# Patient Record
Sex: FEMALE | Race: WHITE | NOT HISPANIC OR LATINO | Employment: FULL TIME | URBAN - METROPOLITAN AREA
[De-identification: names, ages, dates, MRNs, and addresses within clinical notes are randomized per-mention and may not be internally consistent; named-entity substitution may affect disease eponyms.]

---

## 2018-02-03 ENCOUNTER — HOSPITAL ENCOUNTER (EMERGENCY)
Facility: HOSPITAL | Age: 28
Discharge: HOME | End: 2018-02-03
Attending: EMERGENCY MEDICINE | Admitting: EMERGENCY MEDICINE

## 2018-02-03 VITALS
DIASTOLIC BLOOD PRESSURE: 81 MMHG | WEIGHT: 150 LBS | SYSTOLIC BLOOD PRESSURE: 114 MMHG | HEART RATE: 78 BPM | TEMPERATURE: 98.6 F | BODY MASS INDEX: 24.11 KG/M2 | RESPIRATION RATE: 16 BRPM | HEIGHT: 66 IN | OXYGEN SATURATION: 99 %

## 2018-02-03 DIAGNOSIS — E87.6 HYPOKALEMIA: ICD-10-CM

## 2018-02-03 DIAGNOSIS — R00.2 INTERMITTENT PALPITATIONS: ICD-10-CM

## 2018-02-03 DIAGNOSIS — R55 SYNCOPE, UNSPECIFIED SYNCOPE TYPE: Primary | ICD-10-CM

## 2018-02-03 LAB
ANION GAP SERPL CALCULATED.3IONS-SCNC: 8 MMOL/L (ref 3–11)
B-HCG UR QL: NEGATIVE
BASOPHILS # BLD AUTO: 0.04 10*3/MM3 (ref 0–0.2)
BASOPHILS NFR BLD AUTO: 0.5 % (ref 0–1)
BILIRUB UR QL STRIP: NEGATIVE
BUN BLD-MCNC: 10 MG/DL (ref 9–23)
BUN/CREAT SERPL: 16.7 (ref 7–25)
CALCIUM SPEC-SCNC: 8.9 MG/DL (ref 8.7–10.4)
CHLORIDE SERPL-SCNC: 109 MMOL/L (ref 99–109)
CLARITY UR: CLEAR
CO2 SERPL-SCNC: 24 MMOL/L (ref 20–31)
COLOR UR: YELLOW
CREAT BLD-MCNC: 0.6 MG/DL (ref 0.6–1.3)
DEPRECATED RDW RBC AUTO: 42.3 FL (ref 37–54)
EOSINOPHIL # BLD AUTO: 0.75 10*3/MM3 (ref 0–0.3)
EOSINOPHIL NFR BLD AUTO: 9.8 % (ref 0–3)
ERYTHROCYTE [DISTWIDTH] IN BLOOD BY AUTOMATED COUNT: 12.2 % (ref 11.3–14.5)
GFR SERPL CREATININE-BSD FRML MDRD: 118 ML/MIN/1.73
GFR SERPL CREATININE-BSD FRML MDRD: 143 ML/MIN/1.73
GLUCOSE BLD-MCNC: 78 MG/DL (ref 70–100)
GLUCOSE UR STRIP-MCNC: NEGATIVE MG/DL
HCT VFR BLD AUTO: 40 % (ref 34.5–44)
HGB BLD-MCNC: 13.3 G/DL (ref 11.5–15.5)
HGB UR QL STRIP.AUTO: NEGATIVE
IMM GRANULOCYTES # BLD: 0.01 10*3/MM3 (ref 0–0.03)
IMM GRANULOCYTES NFR BLD: 0.1 % (ref 0–0.6)
INTERNAL NEGATIVE CONTROL: NEGATIVE
INTERNAL POSITIVE CONTROL: POSITIVE
KETONES UR QL STRIP: ABNORMAL
LEUKOCYTE ESTERASE UR QL STRIP.AUTO: NEGATIVE
LYMPHOCYTES # BLD AUTO: 1.77 10*3/MM3 (ref 0.6–4.8)
LYMPHOCYTES NFR BLD AUTO: 23.2 % (ref 24–44)
Lab: NORMAL
MCH RBC QN AUTO: 31.5 PG (ref 27–31)
MCHC RBC AUTO-ENTMCNC: 33.3 G/DL (ref 32–36)
MCV RBC AUTO: 94.8 FL (ref 80–99)
MONOCYTES # BLD AUTO: 0.41 10*3/MM3 (ref 0–1)
MONOCYTES NFR BLD AUTO: 5.4 % (ref 0–12)
NEUTROPHILS # BLD AUTO: 4.66 10*3/MM3 (ref 1.5–8.3)
NEUTROPHILS NFR BLD AUTO: 61 % (ref 41–71)
NITRITE UR QL STRIP: NEGATIVE
PH UR STRIP.AUTO: 7.5 [PH] (ref 5–8)
PLATELET # BLD AUTO: 221 10*3/MM3 (ref 150–450)
PMV BLD AUTO: 11.3 FL (ref 6–12)
POTASSIUM BLD-SCNC: 2.8 MMOL/L (ref 3.5–5.5)
PROT UR QL STRIP: NEGATIVE
RBC # BLD AUTO: 4.22 10*6/MM3 (ref 3.89–5.14)
SODIUM BLD-SCNC: 141 MMOL/L (ref 132–146)
SP GR UR STRIP: 1.02 (ref 1–1.03)
UROBILINOGEN UR QL STRIP: ABNORMAL
WBC NRBC COR # BLD: 7.64 10*3/MM3 (ref 3.5–10.8)

## 2018-02-03 PROCEDURE — 81003 URINALYSIS AUTO W/O SCOPE: CPT | Performed by: EMERGENCY MEDICINE

## 2018-02-03 PROCEDURE — 93005 ELECTROCARDIOGRAM TRACING: CPT | Performed by: EMERGENCY MEDICINE

## 2018-02-03 PROCEDURE — 80048 BASIC METABOLIC PNL TOTAL CA: CPT | Performed by: EMERGENCY MEDICINE

## 2018-02-03 PROCEDURE — 99284 EMERGENCY DEPT VISIT MOD MDM: CPT

## 2018-02-03 PROCEDURE — 85025 COMPLETE CBC W/AUTO DIFF WBC: CPT | Performed by: EMERGENCY MEDICINE

## 2018-02-03 RX ORDER — POTASSIUM CHLORIDE 750 MG/1
40 CAPSULE, EXTENDED RELEASE ORAL ONCE
Status: COMPLETED | OUTPATIENT
Start: 2018-02-03 | End: 2018-02-03

## 2018-02-03 RX ADMIN — POTASSIUM CHLORIDE 40 MEQ: 750 CAPSULE, EXTENDED RELEASE ORAL at 21:53

## 2018-02-04 NOTE — ED PROVIDER NOTES
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  She comes the emergency room today after a syncopal episode.    At her baseline she is quite active.  She exercises on a daily basis and generally runs and lifts weights.  She's had no episodes of exertional chest pain, dyspnea, or syncope.    She really just did not feel well today was at the ambulance station and had a get up to do a run she started feel slightly blurry and lightheaded and then essentially passed out.  She was out for about 3 minutes.  There is no incontinence or tongue biting.  She came to quickly.  She is brought to the ER here for further evaluation.    She has a long history of palpitations and is had a Holter monitor the remote past that she's not sure what that showed.  She has palpitations nearly daily and she's hooked herself up to the EKG machine and she seen some premature atrial and ventricular contractions.  She's never had an echo the other type of cardiac testing that she can recall.  She's never had syncope or near syncope in the past that she can recall    She did start on Vyvanse approximate one month ago to help her concentrate more for studies.  She's had no recent illness.  There is been no fever, chills, runny nose, sore throat, or cough.  Bowel movements, urine, and menses been normal and she denies current pregnancy.    There is no family history of cardiac arrhythmia or heart disease that she knows of.    She does not feel like she was injured event today.  She is feels a little bit weak.        All other systems are reviewed and are negative except as noted above.          History provided by:  Patient and spouse      Review of Systems   All other systems reviewed and are negative.      Past Medical History:   Diagnosis Date   • Scarlet fever        Allergies   Allergen Reactions   • Biaxin [Clarithromycin] GI Intolerance   • Latex Hives   • Penicillins Hives       Past Surgical History:    Procedure Laterality Date   • TONSILLECTOMY         History reviewed. No pertinent family history.    Social History     Social History   • Marital status:      Spouse name: N/A   • Number of children: N/A   • Years of education: N/A     Social History Main Topics   • Smoking status: Current Every Day Smoker     Packs/day: 0.50     Types: Cigarettes   • Smokeless tobacco: None   • Alcohol use Yes      Comment: SOCIALLY   • Drug use: No   • Sexual activity: Not Asked     Other Topics Concern   • None     Social History Narrative   • None           Objective   Physical Exam   Constitutional: She is oriented to person, place, and time. She appears well-developed and well-nourished.   She is alert and oriented and in no acute distress   HENT:   Head: Normocephalic and atraumatic.   Right Ear: External ear normal.   Left Ear: External ear normal.   Nose: Nose normal.   Mouth/Throat: Oropharynx is clear and moist.   Eyes: Conjunctivae and EOM are normal. Pupils are equal, round, and reactive to light.   Neck: Normal range of motion. Neck supple. No JVD present. No tracheal deviation present. No thyromegaly present.   Cardiovascular: Normal rate, regular rhythm, normal heart sounds and intact distal pulses.  Exam reveals no gallop and no friction rub.    No murmur heard.  Pulmonary/Chest: Effort normal and breath sounds normal. No respiratory distress. She has no wheezes. She has no rales. She exhibits no tenderness.   Abdominal: Soft. Bowel sounds are normal. She exhibits no distension and no mass. There is no tenderness. No hernia.   Musculoskeletal: Normal range of motion. She exhibits no edema, tenderness or deformity.   Lymphadenopathy:     She has no cervical adenopathy.   Neurological: She is alert and oriented to person, place, and time. She has normal reflexes. She displays normal reflexes. No cranial nerve deficit. She exhibits normal muscle tone. Coordination normal.   Skin: Skin is warm and dry. No  rash noted. No erythema. No pallor.   Psychiatric: She has a normal mood and affect. Her behavior is normal. Judgment and thought content normal.   Nursing note and vitals reviewed.      Procedures         ED Course  ED Course      Recent Results (from the past 24 hour(s))   Basic Metabolic Panel    Collection Time: 02/03/18  7:37 PM   Result Value Ref Range    Glucose 78 70 - 100 mg/dL    BUN 10 9 - 23 mg/dL    Creatinine 0.60 0.60 - 1.30 mg/dL    Sodium 141 132 - 146 mmol/L    Potassium 2.8 (L) 3.5 - 5.5 mmol/L    Chloride 109 99 - 109 mmol/L    CO2 24.0 20.0 - 31.0 mmol/L    Calcium 8.9 8.7 - 10.4 mg/dL    eGFR  African Amer 143 >60 mL/min/1.73    eGFR Non African Amer 118 >60 mL/min/1.73    BUN/Creatinine Ratio 16.7 7.0 - 25.0    Anion Gap 8.0 3.0 - 11.0 mmol/L   CBC Auto Differential    Collection Time: 02/03/18  7:38 PM   Result Value Ref Range    WBC 7.64 3.50 - 10.80 10*3/mm3    RBC 4.22 3.89 - 5.14 10*6/mm3    Hemoglobin 13.3 11.5 - 15.5 g/dL    Hematocrit 40.0 34.5 - 44.0 %    MCV 94.8 80.0 - 99.0 fL    MCH 31.5 (H) 27.0 - 31.0 pg    MCHC 33.3 32.0 - 36.0 g/dL    RDW 12.2 11.3 - 14.5 %    RDW-SD 42.3 37.0 - 54.0 fl    MPV 11.3 6.0 - 12.0 fL    Platelets 221 150 - 450 10*3/mm3    Neutrophil % 61.0 41.0 - 71.0 %    Lymphocyte % 23.2 (L) 24.0 - 44.0 %    Monocyte % 5.4 0.0 - 12.0 %    Eosinophil % 9.8 (H) 0.0 - 3.0 %    Basophil % 0.5 0.0 - 1.0 %    Immature Grans % 0.1 0.0 - 0.6 %    Neutrophils, Absolute 4.66 1.50 - 8.30 10*3/mm3    Lymphocytes, Absolute 1.77 0.60 - 4.80 10*3/mm3    Monocytes, Absolute 0.41 0.00 - 1.00 10*3/mm3    Eosinophils, Absolute 0.75 (H) 0.00 - 0.30 10*3/mm3    Basophils, Absolute 0.04 0.00 - 0.20 10*3/mm3    Immature Grans, Absolute 0.01 0.00 - 0.03 10*3/mm3   Urinalysis With / Culture If Indicated - Urine, Clean Catch    Collection Time: 02/03/18  8:19 PM   Result Value Ref Range    Color, UA Yellow Yellow, Straw    Appearance, UA Clear Clear    pH, UA 7.5 5.0 - 8.0    Specific  Gravity, UA 1.018 1.001 - 1.030    Glucose, UA Negative Negative    Ketones, UA Trace (A) Negative    Bilirubin, UA Negative Negative    Blood, UA Negative Negative    Protein, UA Negative Negative    Leuk Esterase, UA Negative Negative    Nitrite, UA Negative Negative    Urobilinogen, UA 0.2 E.U./dL 0.2 - 1.0 E.U./dL   POCT Pregnancy, urine    Collection Time: 02/03/18  8:23 PM   Result Value Ref Range    HCG, Urine, QL Negative Negative    Lot Number [id number]     Internal Positive Control Positive     Internal Negative Control Negative      Note: In addition to lab results from this visit, the labs listed above may include labs taken at another facility or during a different encounter within the last 24 hours. Please correlate lab times with ED admission and discharge times for further clarification of the services performed during this visit.    No orders to display     Vitals:    02/03/18 2002 02/03/18 2030 02/03/18 2100 02/03/18 2130   BP: (!) 123/104 112/85 113/91 114/81   BP Location:       Patient Position: Standing      Pulse: 80 109 67 62   Resp:       Temp:       TempSrc:       SpO2:  99% 98% 98%   Weight:       Height:         Medications   potassium chloride (MICRO-K) CR capsule 40 mEq (not administered)     ECG/EMG Results (last 24 hours)     Procedure Component Value Units Date/Time    ECG 12 Lead [[id number]] Collected:  02/03/18 1921     Updated:  02/03/18 2010    Narrative:       Test Reason : SYNCOPE  Blood Pressure : **/** mmHG  Vent. Rate : 066 BPM     Atrial Rate : 066 BPM     P-R Int : 148 ms          QRS Dur : 080 ms      QT Int : 416 ms       P-R-T Axes : 051 027 033 degrees     QTc Int : 436 ms    Normal sinus rhythm  Normal ECG  No previous ECGs available  Confirmed by YUAN ODONNELL MD ([id number]) on 2/3/2018 8:10:41 PM    Referred By:  EDMD           Confirmed By:YUAN ODONNELL MD                  [facility]  Number of Diagnoses or Management Options  Hypokalemia:   Intermittent palpitations:    Syncope, unspecified syncope type:   Diagnosis management comments:       I reviewed all available studies at the bedside with the patient and her family.  Her labs are reassuring say for a slightly low potassium at 2.8.  Her EKG was reassuring with normal QT and no arrhythmia here that when I did watch her cardiac monitor she had occasional PVCs.    The young lady's had an episode of syncope today.  She's had palpitations for quite some time had a remote workup I think it's reasonable to replete her potassium and put her on a high potassium diet and have her follow-up early next week with her primary doctor to recheck her potassium and refer to our cardiac event center so she can get cardiac monitoring to see if she's having any type of malignant arrhythmia.    I talked her she had other episodes of syncope she may need further evaluation and tilt table testing and she understands this.  She'll call her doctor on Monday for follow-up.  Talked about strategies to help mitigate syncopal event.  She'll return to the ER if worsen anyway.    All are agreeable with the plan      Final diagnoses:   Syncope, unspecified syncope type   Hypokalemia   Intermittent palpitations            Wilbert Cortes MD  02/03/18 [id number]

## 2018-02-08 ENCOUNTER — HOSPITAL ENCOUNTER (OUTPATIENT)
Dept: CARDIOLOGY | Facility: HOSPITAL | Age: 28
Discharge: HOME | End: 2018-02-08

## 2018-02-08 ENCOUNTER — HOSPITAL ENCOUNTER (OUTPATIENT)
Dept: CARDIOLOGY | Facility: HOSPITAL | Age: 28
Discharge: HOME | End: 2018-02-08
Admitting: NURSE PRACTITIONER

## 2018-02-08 ENCOUNTER — OFFICE VISIT (OUTPATIENT)
Dept: CARDIOLOGY | Facility: HOSPITAL | Age: 28
End: 2018-02-08

## 2018-02-08 VITALS
SYSTOLIC BLOOD PRESSURE: 119 MMHG | WEIGHT: 157 LBS | RESPIRATION RATE: 17 BRPM | OXYGEN SATURATION: 98 % | TEMPERATURE: 98.1 F | BODY MASS INDEX: 24.64 KG/M2 | HEIGHT: 67 IN | HEART RATE: 91 BPM | DIASTOLIC BLOOD PRESSURE: 70 MMHG

## 2018-02-08 DIAGNOSIS — R55 SYNCOPE, UNSPECIFIED SYNCOPE TYPE: ICD-10-CM

## 2018-02-08 DIAGNOSIS — R00.2 PALPITATIONS: ICD-10-CM

## 2018-02-08 DIAGNOSIS — R55 SYNCOPE, UNSPECIFIED SYNCOPE TYPE: Primary | ICD-10-CM

## 2018-02-08 DIAGNOSIS — E87.6 HYPOKALEMIA: ICD-10-CM

## 2018-02-08 PROBLEM — K21.9 GERD (GASTROESOPHAGEAL REFLUX DISEASE): Status: ACTIVE | Noted: 2018-02-08

## 2018-02-08 LAB
ANION GAP SERPL CALCULATED.3IONS-SCNC: 9 MMOL/L (ref 3–11)
BUN BLD-MCNC: 12 MG/DL (ref 9–23)
BUN/CREAT SERPL: 17.1 (ref 7–25)
CALCIUM SPEC-SCNC: 9.2 MG/DL (ref 8.7–10.4)
CHLORIDE SERPL-SCNC: 103 MMOL/L (ref 99–109)
CO2 SERPL-SCNC: 26 MMOL/L (ref 20–31)
CREAT BLD-MCNC: 0.7 MG/DL (ref 0.6–1.3)
GFR SERPL CREATININE-BSD FRML MDRD: 99 ML/MIN/1.73
GLUCOSE BLD-MCNC: 75 MG/DL (ref 70–100)
POTASSIUM BLD-SCNC: 4.3 MMOL/L (ref 3.5–5.5)
SODIUM BLD-SCNC: 138 MMOL/L (ref 132–146)

## 2018-02-08 PROCEDURE — 99203 OFFICE O/P NEW LOW 30 MIN: CPT | Performed by: NURSE PRACTITIONER

## 2018-02-08 PROCEDURE — 0296T HC EXT ECG > 48HR TO 21 DAY RCRD W/CONECT INTL RCRD: CPT

## 2018-02-08 PROCEDURE — 80048 BASIC METABOLIC PNL TOTAL CA: CPT | Performed by: NURSE PRACTITIONER

## 2018-02-08 PROCEDURE — 93306 TTE W/DOPPLER COMPLETE: CPT

## 2018-02-08 PROCEDURE — 93306 TTE W/DOPPLER COMPLETE: CPT | Performed by: INTERNAL MEDICINE

## 2018-02-08 RX ORDER — LISDEXAMFETAMINE DIMESYLATE 30 MG/1
30 CAPSULE ORAL DAILY
COMMUNITY
Start: 2018-01-12

## 2018-02-08 RX ORDER — RANITIDINE 150 MG/1
150 TABLET ORAL 2 TIMES DAILY PRN
COMMUNITY

## 2018-02-08 RX ORDER — IBUPROFEN 200 MG
400 TABLET ORAL 2 TIMES DAILY PRN
COMMUNITY

## 2018-02-08 NOTE — PROGRESS NOTES
Encounter Date:02/08/2018      Patient ID: Jw Ray is a 27 y.o. female.        Subjective:     Chief Complaint: Establish Care and Loss of Consciousness   History of Present Illness patient presents to the office today for ongoing evaluation of her syncope. Patient presented to [facility] ED on 2/3/18 after a syncopal spell that occurred while at work. 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 She notes prior to her syncopal spell she experienced dizziness and lightheadedness. Her syncopal spell was witnessed and she was out about 3 minutes.     At her baseline she is quite active.  She exercises on a daily basis and generally runs and lifts weights.  She's had no episodes of exertional chest pain, dyspnea, or syncope.     She has a long history of palpitations and is had a Holter monitor the remote past that she's not sure what that showed.  She has palpitations nearly daily and she's hooked herself up to the EKG machine and she seen some premature atrial and ventricular contractions.   She's never had syncope or near syncope in the past that she can recall     She did start on Vyvanse approximate one month ago to help her concentrate more for studies.  She's had no recent illness.  There is been no fever, chills, runny nose, sore throat, or cough.  Bowel movements, urine, and menses been normal and she denies current pregnancy.     There is no family history of cardiac arrhythmia or heart disease that she knows of.    Problem   Syncope   Palpitations   Hypokalemia   Gerd (Gastroesophageal Reflux Disease)       Past Surgical History:   Procedure Laterality Date   • TONSILLECTOMY     • WISDOM TOOTH EXTRACTION         Allergies   Allergen Reactions   • Biaxin [Clarithromycin] GI Intolerance   • Latex Hives   • Penicillins Hives         Current Outpatient Prescriptions:   •  ibuprofen (ADVIL,MOTRIN) 200 MG tablet, Take 400 mg by mouth 2 (Two) Times a Day As Needed for Headache., Disp: , Rfl:   •  raNITIdine (ZANTAC) 150 MG  tablet, Take 150 mg by mouth 2 (Two) Times a Day As Needed for Heartburn., Disp: , Rfl:   •  VYVANSE 30 MG capsule, Take 30 mg by mouth Daily, Disp: , Rfl:     The following portions of the chart were reviewed and updated as appropriate: Allergies, current medications, past family history, social history, past medical history.     Review of Systems   Constitution: Positive for weakness and malaise/fatigue. Negative for chills, decreased appetite, diaphoresis, fever, night sweats, weight gain and weight loss.   HENT: Positive for nosebleeds. Negative for congestion, hearing loss and hoarse voice.    Eyes: Negative for blurred vision, visual disturbance and visual halos.   Cardiovascular: Positive for irregular heartbeat, near-syncope, palpitations and syncope. Negative for chest pain, claudication, cyanosis, dyspnea on exertion, leg swelling, orthopnea and paroxysmal nocturnal dyspnea.   Respiratory: Negative for cough, hemoptysis, shortness of breath, sleep disturbances due to breathing, snoring, sputum production and wheezing.    Hematologic/Lymphatic: Negative for bleeding problem. Does not bruise/bleed easily.   Skin: Negative for dry skin, itching and rash.   Musculoskeletal: Negative for arthritis, joint pain, joint swelling and myalgias.   Gastrointestinal: Positive for heartburn. Negative for bloating, abdominal pain, constipation, diarrhea, flatus, hematemesis, hematochezia, melena, nausea and vomiting.   Genitourinary: Negative for dysuria, frequency, hematuria, nocturia and urgency.   Neurological: Positive for headaches. Negative for excessive daytime sleepiness, dizziness, light-headedness and loss of balance.   Psychiatric/Behavioral: Negative for depression. The patient does not have insomnia and is not nervous/anxious.            Objective:     Vitals:    02/08/18 1243 02/08/18 1245 02/08/18 1246   BP: 114/79 117/74 119/70   BP Location: Right arm Left arm Left arm   Patient Position: Sitting Sitting  "Sitting   Cuff Size: Adult     Pulse: 81 81 91   Resp: 17     Temp: 98.1 °F (36.7 °C)     TempSrc: Temporal Artery      SpO2: 98%     Weight: 71.2 kg (157 lb)     Height: 170.2 cm (67\")           Physical Exam   Constitutional: She is oriented to person, place, and time. She appears well-developed and well-nourished. She is active and cooperative. No distress.   HENT:   Head: Normocephalic and atraumatic.   Mouth/Throat: Oropharynx is clear and moist.   Eyes: Conjunctivae and EOM are normal. Pupils are equal, round, and reactive to light.   Neck: Normal range of motion. Neck supple. No JVD present. No tracheal deviation present. No thyromegaly present.   Cardiovascular: Normal rate, regular rhythm, normal heart sounds and intact distal pulses.    Pulmonary/Chest: Effort normal and breath sounds normal.   Abdominal: Soft. Bowel sounds are normal. She exhibits no distension. There is no tenderness.   Musculoskeletal: Normal range of motion.   Neurological: She is alert and oriented to person, place, and time.   Skin: Skin is warm, dry and intact.   Psychiatric: She has a normal mood and affect. Her behavior is normal.   Nursing note and vitals reviewed.      Lab and Diagnostic Review:      Results for orders placed or performed during the hospital encounter of 02/03/18   Basic Metabolic Panel   Result Value Ref Range    Glucose 78 70 - 100 mg/dL    BUN 10 9 - 23 mg/dL    Creatinine 0.60 0.60 - 1.30 mg/dL    Sodium 141 132 - 146 mmol/L    Potassium 2.8 (L) 3.5 - 5.5 mmol/L    Chloride 109 99 - 109 mmol/L    CO2 24.0 20.0 - 31.0 mmol/L    Calcium 8.9 8.7 - 10.4 mg/dL    eGFR  African Amer 143 >60 mL/min/1.73    eGFR Non African Amer 118 >60 mL/min/1.73    BUN/Creatinine Ratio 16.7 7.0 - 25.0    Anion Gap 8.0 3.0 - 11.0 mmol/L   Urinalysis With / Culture If Indicated - Urine, Clean Catch   Result Value Ref Range    Color, UA Yellow Yellow, Straw    Appearance, UA Clear Clear    pH, UA 7.5 5.0 - 8.0    Specific Gravity, " UA 1.018 1.001 - 1.030    Glucose, UA Negative Negative    Ketones, UA Trace (A) Negative    Bilirubin, UA Negative Negative    Blood, UA Negative Negative    Protein, UA Negative Negative    Leuk Esterase, UA Negative Negative    Nitrite, UA Negative Negative    Urobilinogen, UA 0.2 E.U./dL 0.2 - 1.0 E.U./dL   CBC Auto Differential   Result Value Ref Range    WBC 7.64 3.50 - 10.80 10*3/mm3    RBC 4.22 3.89 - 5.14 10*6/mm3    Hemoglobin 13.3 11.5 - 15.5 g/dL    Hematocrit 40.0 34.5 - 44.0 %    MCV 94.8 80.0 - 99.0 fL    MCH 31.5 (H) 27.0 - 31.0 pg    MCHC 33.3 32.0 - 36.0 g/dL    RDW 12.2 11.3 - 14.5 %    RDW-SD 42.3 37.0 - 54.0 fl    MPV 11.3 6.0 - 12.0 fL    Platelets 221 150 - 450 10*3/mm3    Neutrophil % 61.0 41.0 - 71.0 %    Lymphocyte % 23.2 (L) 24.0 - 44.0 %    Monocyte % 5.4 0.0 - 12.0 %    Eosinophil % 9.8 (H) 0.0 - 3.0 %    Basophil % 0.5 0.0 - 1.0 %    Immature Grans % 0.1 0.0 - 0.6 %    Neutrophils, Absolute 4.66 1.50 - 8.30 10*3/mm3    Lymphocytes, Absolute 1.77 0.60 - 4.80 10*3/mm3    Monocytes, Absolute 0.41 0.00 - 1.00 10*3/mm3    Eosinophils, Absolute 0.75 (H) 0.00 - 0.30 10*3/mm3    Basophils, Absolute 0.04 0.00 - 0.20 10*3/mm3    Immature Grans, Absolute 0.01 0.00 - 0.03 10*3/mm3   POCT Pregnancy, urine   Result Value Ref Range    HCG, Urine, QL Negative Negative    Lot Number [id number]     Internal Positive Control Positive     Internal Negative Control Negative      EKG 2/3/18: NSR    Assessment and Plan:         1. Syncope, unspecified syncope type    - Adult Transthoracic Echo Complete W/ Cont if Necessary Per Protocol; Future  - Holter Monitor - 72 Hour Up To 21 Days    2. Palpitations    - Adult Transthoracic Echo Complete W/ Cont if Necessary Per Protocol; Future  - Holter Monitor - 72 Hour Up To 21 Days to assess burden     3. Hypokalemia    - Basic Metabolic Panel    Further treatment plan pending results of above mentioned tests.     It has been a pleasure to participate in the care of  this patient.  Patient was instructed to call the [facility] with any questions, concerns, or worsening symptoms.  * Please note that portions of this note were completed with a voice recognition program. Efforts were made to edit the dictation but occasionally words are transcribed.

## 2018-02-09 LAB
BH CV ECHO MEAS - AO MAX PG (FULL): 4.9 MMHG
BH CV ECHO MEAS - AO MAX PG: 9 MMHG
BH CV ECHO MEAS - AO MEAN PG (FULL): 2.9 MMHG
BH CV ECHO MEAS - AO MEAN PG: 5.2 MMHG
BH CV ECHO MEAS - AO ROOT AREA (BSA CORRECTED): 1.5
BH CV ECHO MEAS - AO ROOT AREA: 5.6 CM^2
BH CV ECHO MEAS - AO ROOT DIAM: 2.7 CM
BH CV ECHO MEAS - AO V2 MAX: 153.1 CM/SEC
BH CV ECHO MEAS - AO V2 MEAN: 104.5 CM/SEC
BH CV ECHO MEAS - AO V2 VTI: 31.6 CM
BH CV ECHO MEAS - ASC AORTA: 2.3 CM
BH CV ECHO MEAS - AVA(I,A): 2.1 CM^2
BH CV ECHO MEAS - AVA(I,D): 2.1 CM^2
BH CV ECHO MEAS - AVA(V,A): 2 CM^2
BH CV ECHO MEAS - AVA(V,D): 2 CM^2
BH CV ECHO MEAS - BSA(HAYCOCK): 1.8 M^2
BH CV ECHO MEAS - BSA: 1.8 M^2
BH CV ECHO MEAS - BZI_BMI: 23.5 KILOGRAMS/M^2
BH CV ECHO MEAS - BZI_METRIC_HEIGHT: 170.2 CM
BH CV ECHO MEAS - BZI_METRIC_WEIGHT: 68 KG
BH CV ECHO MEAS - EDV(CUBED): 73.3 ML
BH CV ECHO MEAS - EDV(TEICH): 77.9 ML
BH CV ECHO MEAS - EF(CUBED): 73.1 %
BH CV ECHO MEAS - EF(TEICH): 65.3 %
BH CV ECHO MEAS - ESV(CUBED): 19.7 ML
BH CV ECHO MEAS - ESV(TEICH): 27 ML
BH CV ECHO MEAS - FS: 35.5 %
BH CV ECHO MEAS - IVS/LVPW: 0.78
BH CV ECHO MEAS - IVSD: 0.82 CM
BH CV ECHO MEAS - LA DIMENSION: 3.2 CM
BH CV ECHO MEAS - LA/AO: 1.2
BH CV ECHO MEAS - LV MASS(C)D: 124.7 GRAMS
BH CV ECHO MEAS - LV MASS(C)DI: 69.7 GRAMS/M^2
BH CV ECHO MEAS - LV MAX PG: 4.1 MMHG
BH CV ECHO MEAS - LV MEAN PG: 2.3 MMHG
BH CV ECHO MEAS - LV V1 MAX: 100.7 CM/SEC
BH CV ECHO MEAS - LV V1 MEAN: 68.2 CM/SEC
BH CV ECHO MEAS - LV V1 VTI: 22.1 CM
BH CV ECHO MEAS - LVIDD: 4.2 CM
BH CV ECHO MEAS - LVIDS: 2.7 CM
BH CV ECHO MEAS - LVOT AREA (M): 3.1 CM^2
BH CV ECHO MEAS - LVOT AREA: 3.1 CM^2
BH CV ECHO MEAS - LVOT DIAM: 2 CM
BH CV ECHO MEAS - LVPWD: 1.1 CM
BH CV ECHO MEAS - MV A MAX VEL: 51.3 CM/SEC
BH CV ECHO MEAS - MV DEC TIME: 0.17 SEC
BH CV ECHO MEAS - MV E MAX VEL: 78.5 CM/SEC
BH CV ECHO MEAS - MV E/A: 1.5
BH CV ECHO MEAS - PA ACC SLOPE: 462.1 CM/SEC^2
BH CV ECHO MEAS - PA ACC TIME: 0.17 SEC
BH CV ECHO MEAS - PA MAX PG: 4.8 MMHG
BH CV ECHO MEAS - PA PR(ACCEL): 4.5 MMHG
BH CV ECHO MEAS - PA V2 MAX: 109.7 CM/SEC
BH CV ECHO MEAS - RVDD: 1.8 CM
BH CV ECHO MEAS - SI(AO): 99.8 ML/M^2
BH CV ECHO MEAS - SI(CUBED): 29.9 ML/M^2
BH CV ECHO MEAS - SI(LVOT): 37.9 ML/M^2
BH CV ECHO MEAS - SI(TEICH): 28.4 ML/M^2
BH CV ECHO MEAS - SV(AO): 178.6 ML
BH CV ECHO MEAS - SV(CUBED): 53.6 ML
BH CV ECHO MEAS - SV(LVOT): 67.9 ML
BH CV ECHO MEAS - SV(TEICH): 50.9 ML
BH CV ECHO MEAS - TAPSE (>1.6): 2 CM2
BH CV ECHO MEAS - TV MAX PG: 1.5 MMHG
BH CV ECHO MEAS - TV V2 MAX: 60.2 CM/SEC
BH CV XLRA - RV BASE: 3.5 CM
BH CV XLRA - RV LENGTH: 6.1 CM
BH CV XLRA - RV MID: 2.7 CM
BH CV XLRA - TDI S': 12.1 CM/SEC
LV EF 2D ECHO EST: 65 %

## 2018-03-09 ENCOUNTER — OFFICE VISIT (OUTPATIENT)
Dept: CARDIOLOGY | Facility: HOSPITAL | Age: 28
End: 2018-03-09

## 2018-03-09 VITALS
WEIGHT: 154 LBS | HEIGHT: 66 IN | SYSTOLIC BLOOD PRESSURE: 113 MMHG | BODY MASS INDEX: 24.75 KG/M2 | TEMPERATURE: 98.2 F | RESPIRATION RATE: 18 BRPM | OXYGEN SATURATION: 100 % | HEART RATE: 117 BPM | DIASTOLIC BLOOD PRESSURE: 81 MMHG

## 2018-03-09 DIAGNOSIS — E87.6 HYPOKALEMIA: ICD-10-CM

## 2018-03-09 DIAGNOSIS — R55 SYNCOPE, UNSPECIFIED SYNCOPE TYPE: ICD-10-CM

## 2018-03-09 DIAGNOSIS — R00.2 PALPITATIONS: Primary | ICD-10-CM

## 2018-03-09 PROCEDURE — 99214 OFFICE O/P EST MOD 30 MIN: CPT | Performed by: NURSE PRACTITIONER

## 2018-03-09 NOTE — PROGRESS NOTES
Encounter Date:03/09/2018      Patient ID: Jw Ray is a 27 y.o. female.        Subjective:     Chief Complaint: Follow-up   syncope, palpitations  History of Present Illness patient presents to the office today for ongoing evaluation of her syncopal spell that occurred on 2/3/2018 in the setting of hypokalemia.  Patient notes she has had no further syncopal spells.  She notes palpitations occur daily but she reports she has experienced them for years and they are not bothersome.  She notes she is feeling fine. Denies chest pain, chest pressure, presyncope, syncope, orthopnea, pnd, abdominal fullness, early satiety, claudication, cough or edema.     Patient Active Problem List   Diagnosis   • Syncope   • Palpitations   • Hypokalemia   • GERD (gastroesophageal reflux disease)       Past Surgical History:   Procedure Laterality Date   • TONSILLECTOMY     • WISDOM TOOTH EXTRACTION         Allergies   Allergen Reactions   • Biaxin [Clarithromycin] GI Intolerance   • Latex Hives   • Penicillins Hives         Current Outpatient Prescriptions:   •  ibuprofen (ADVIL,MOTRIN) 200 MG tablet, Take 400 mg by mouth 2 (Two) Times a Day As Needed for Headache., Disp: , Rfl:   •  raNITIdine (ZANTAC) 150 MG tablet, Take 150 mg by mouth 2 (Two) Times a Day As Needed for Heartburn., Disp: , Rfl:   •  VYVANSE 30 MG capsule, Take 30 mg by mouth Daily, Disp: , Rfl:     The following portions of the chart were reviewed and updated as appropriate: Allergies, current medications, past family history, social history, past medical history.     Review of Systems   Constitution: Negative for chills, decreased appetite, diaphoresis, fever, weakness, malaise/fatigue, night sweats, weight gain and weight loss.   HENT: Negative for congestion, hearing loss, hoarse voice and nosebleeds.    Eyes: Negative for blurred vision, visual disturbance and visual halos.   Cardiovascular: Positive for irregular heartbeat and palpitations. Negative for  "chest pain, claudication, cyanosis, dyspnea on exertion, leg swelling, near-syncope, orthopnea, paroxysmal nocturnal dyspnea and syncope.   Respiratory: Negative for cough, hemoptysis, shortness of breath, sleep disturbances due to breathing, snoring, sputum production and wheezing.    Hematologic/Lymphatic: Negative for bleeding problem. Does not bruise/bleed easily.   Skin: Negative for dry skin, itching and rash.   Musculoskeletal: Negative for arthritis, joint pain, joint swelling and myalgias.   Gastrointestinal: Positive for heartburn. Negative for bloating, abdominal pain, constipation, diarrhea, flatus, hematemesis, hematochezia, melena, nausea and vomiting.   Genitourinary: Negative for dysuria, frequency, hematuria, nocturia and urgency.   Neurological: Negative for excessive daytime sleepiness, dizziness, headaches, light-headedness and loss of balance.   Psychiatric/Behavioral: Negative for depression. The patient does not have insomnia and is not nervous/anxious.            Objective:     Vitals:    03/09/18 1314 03/09/18 1316 03/09/18 1317   BP: 112/76 113/77 113/81   BP Location: Right arm Left arm Left arm   Patient Position: Sitting Sitting Standing   Cuff Size: Adult     Pulse: 77 78 117   Resp: 18     Temp: 98.2 °F (36.8 °C)     TempSrc: Temporal Artery      SpO2: 100%     Weight: 69.9 kg (154 lb)     Height: 167.6 cm (66\")           Physical Exam   Constitutional: She is oriented to person, place, and time. She appears well-developed and well-nourished. She is active and cooperative. No distress.   HENT:   Head: Normocephalic and atraumatic.   Mouth/Throat: Oropharynx is clear and moist.   Eyes: Conjunctivae and EOM are normal. Pupils are equal, round, and reactive to light.   Neck: Normal range of motion. Neck supple. No JVD present. No tracheal deviation present. No thyromegaly present.   Cardiovascular: Normal rate, regular rhythm, normal heart sounds and intact distal pulses.  "   Pulmonary/Chest: Effort normal and breath sounds normal.   Abdominal: Soft. Bowel sounds are normal. She exhibits no distension. There is no tenderness.   Musculoskeletal: Normal range of motion.   Neurological: She is alert and oriented to person, place, and time.   Skin: Skin is warm, dry and intact.   Psychiatric: She has a normal mood and affect. Her behavior is normal.   Nursing note and vitals reviewed.      Lab and Diagnostic Review:   ·  ECHO 2/8/18: Left ventricular systolic function is normal. Estimated EF = 65%.  · The cardiac valves are anatomically and functionally normal.      Zio monitor worn for 13 days and 4 hours: Average heart rate 82 bpm, minimum heart rate 43 bpm (6 AM), max heart rate 173  PACs less than 1%, PVCs less than 1% no arrhythmias noted.  I have personally reviewed ZO monitor and echo with patient and her  during office visit today.  Lab Results   Component Value Date    GLUCOSE 75 02/08/2018    CALCIUM 9.2 02/08/2018     02/08/2018    K 4.3 02/08/2018    CO2 26.0 02/08/2018     02/08/2018    BUN 12 02/08/2018    CREATININE 0.70 02/08/2018    EGFRIFAFRI 143 02/03/2018    EGFRIFNONA 99 02/08/2018    BCR 17.1 02/08/2018    ANIONGAP 9.0 02/08/2018     Assessment and Plan:         1. Palpitations  Recent Zio showed less than 1% burden    2. 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 Hypokalemia  Resolved    F/u prn    It has been a pleasure to participate in the care of this patient.  Patient was instructed to call the [facility] with any questions, concerns, or  worsening symptoms.      * Please note that portions of this note were completed with a voice recognition program. Efforts were made to edit the dictation but occasionally words are transcribed.

## 2018-03-12 PROCEDURE — 0298T HOLTER MONITOR - 72 HOUR UP TO 21 DAY: CPT | Performed by: INTERNAL MEDICINE

## 2022-10-15 ENCOUNTER — APPOINTMENT (OUTPATIENT)
Dept: CT IMAGING | Facility: HOSPITAL | Age: 32
End: 2022-10-15

## 2022-10-15 ENCOUNTER — HOSPITAL ENCOUNTER (EMERGENCY)
Facility: HOSPITAL | Age: 32
Discharge: HOME | End: 2022-10-15
Attending: EMERGENCY MEDICINE | Admitting: EMERGENCY MEDICINE

## 2022-10-15 VITALS
RESPIRATION RATE: 15 BRPM | TEMPERATURE: 98.7 F | WEIGHT: 145 LBS | BODY MASS INDEX: 23.3 KG/M2 | HEART RATE: 78 BPM | DIASTOLIC BLOOD PRESSURE: 79 MMHG | SYSTOLIC BLOOD PRESSURE: 107 MMHG | HEIGHT: 66 IN | OXYGEN SATURATION: 98 %

## 2022-10-15 DIAGNOSIS — J02.9 VIRAL PHARYNGITIS: Primary | ICD-10-CM

## 2022-10-15 LAB
B-HCG UR QL: NEGATIVE
BACTERIA UR QL AUTO: ABNORMAL /HPF
BASOPHILS # BLD AUTO: 0.04 10*3/MM3 (ref 0–0.2)
BASOPHILS NFR BLD AUTO: 0.6 % (ref 0–1.5)
BILIRUB UR QL STRIP: NEGATIVE
BUN BLDA-MCNC: 10 MG/DL (ref 8–26)
CA-I BLDA-SCNC: 1.26 MMOL/L (ref 1.2–1.32)
CHLORIDE BLDA-SCNC: 101 MMOL/L (ref 98–109)
CLARITY UR: ABNORMAL
CO2 BLDA-SCNC: 27 MMOL/L (ref 24–29)
COLOR UR: YELLOW
CREAT BLDA-MCNC: 0.7 MG/DL (ref 0.6–1.3)
DEPRECATED RDW RBC AUTO: 43.6 FL (ref 37–54)
EGFRCR SERPLBLD CKD-EPI 2021: 117.3 ML/MIN/1.73
EOSINOPHIL # BLD AUTO: 0.43 10*3/MM3 (ref 0–0.4)
EOSINOPHIL NFR BLD AUTO: 6.5 % (ref 0.3–6.2)
ERYTHROCYTE [DISTWIDTH] IN BLOOD BY AUTOMATED COUNT: 13.8 % (ref 12.3–15.4)
EXPIRATION DATE: NORMAL
GLUCOSE BLDC GLUCOMTR-MCNC: 81 MG/DL (ref 70–130)
GLUCOSE UR STRIP-MCNC: NEGATIVE MG/DL
HCT VFR BLD AUTO: 34.9 % (ref 34–46.6)
HCT VFR BLDA CALC: 36 % (ref 38–51)
HETEROPH AB SER QL LA: NEGATIVE
HGB BLD-MCNC: 11.4 G/DL (ref 12–15.9)
HGB BLDA-MCNC: 12.2 G/DL (ref 12–17)
HGB UR QL STRIP.AUTO: NEGATIVE
HYALINE CASTS UR QL AUTO: ABNORMAL /LPF
IMM GRANULOCYTES # BLD AUTO: 0.02 10*3/MM3 (ref 0–0.05)
IMM GRANULOCYTES NFR BLD AUTO: 0.3 % (ref 0–0.5)
INTERNAL NEGATIVE CONTROL: NEGATIVE
INTERNAL POSITIVE CONTROL: POSITIVE
KETONES UR QL STRIP: NEGATIVE
LEUKOCYTE ESTERASE UR QL STRIP.AUTO: NEGATIVE
LYMPHOCYTES # BLD AUTO: 1.93 10*3/MM3 (ref 0.7–3.1)
LYMPHOCYTES NFR BLD AUTO: 29 % (ref 19.6–45.3)
Lab: NORMAL
MCH RBC QN AUTO: 28.4 PG (ref 26.6–33)
MCHC RBC AUTO-ENTMCNC: 32.7 G/DL (ref 31.5–35.7)
MCV RBC AUTO: 87 FL (ref 79–97)
MONOCYTES # BLD AUTO: 0.5 10*3/MM3 (ref 0.1–0.9)
MONOCYTES NFR BLD AUTO: 7.5 % (ref 5–12)
MUCOUS THREADS URNS QL MICRO: ABNORMAL /HPF
NEUTROPHILS NFR BLD AUTO: 3.73 10*3/MM3 (ref 1.7–7)
NEUTROPHILS NFR BLD AUTO: 56.1 % (ref 42.7–76)
NITRITE UR QL STRIP: NEGATIVE
NRBC BLD AUTO-RTO: 0 /100 WBC (ref 0–0.2)
PH UR STRIP.AUTO: <=5 [PH] (ref 5–8)
PLATELET # BLD AUTO: 346 10*3/MM3 (ref 140–450)
PMV BLD AUTO: 10.4 FL (ref 6–12)
POTASSIUM BLDA-SCNC: 3.6 MMOL/L (ref 3.5–4.9)
PROT UR QL STRIP: NEGATIVE
RBC # BLD AUTO: 4.01 10*6/MM3 (ref 3.77–5.28)
RBC # UR STRIP: ABNORMAL /HPF
REF LAB TEST METHOD: ABNORMAL
S PYO AG THROAT QL: NEGATIVE
SODIUM BLD-SCNC: 140 MMOL/L (ref 138–146)
SP GR UR STRIP: 1.02 (ref 1–1.03)
SQUAMOUS #/AREA URNS HPF: ABNORMAL /HPF
UROBILINOGEN UR QL STRIP: ABNORMAL
WBC # UR STRIP: ABNORMAL /HPF
WBC NRBC COR # BLD: 6.65 10*3/MM3 (ref 3.4–10.8)

## 2022-10-15 PROCEDURE — 80047 BASIC METABLC PNL IONIZED CA: CPT

## 2022-10-15 PROCEDURE — 99284 EMERGENCY DEPT VISIT MOD MDM: CPT

## 2022-10-15 PROCEDURE — 25010000002 DEXAMETHASONE SODIUM PHOSPHATE 100 MG/10ML SOLUTION: Performed by: EMERGENCY MEDICINE

## 2022-10-15 PROCEDURE — 85025 COMPLETE CBC W/AUTO DIFF WBC: CPT | Performed by: EMERGENCY MEDICINE

## 2022-10-15 PROCEDURE — 87081 CULTURE SCREEN ONLY: CPT | Performed by: EMERGENCY MEDICINE

## 2022-10-15 PROCEDURE — 85014 HEMATOCRIT: CPT

## 2022-10-15 PROCEDURE — 81001 URINALYSIS AUTO W/SCOPE: CPT | Performed by: EMERGENCY MEDICINE

## 2022-10-15 PROCEDURE — 96374 THER/PROPH/DIAG INJ IV PUSH: CPT

## 2022-10-15 PROCEDURE — 87880 STREP A ASSAY W/OPTIC: CPT | Performed by: EMERGENCY MEDICINE

## 2022-10-15 PROCEDURE — 86308 HETEROPHILE ANTIBODY SCREEN: CPT | Performed by: EMERGENCY MEDICINE

## 2022-10-15 PROCEDURE — 25010000002 IOPAMIDOL 61 % SOLUTION: Performed by: EMERGENCY MEDICINE

## 2022-10-15 PROCEDURE — 70491 CT SOFT TISSUE NECK W/DYE: CPT

## 2022-10-15 PROCEDURE — 81025 URINE PREGNANCY TEST: CPT | Performed by: EMERGENCY MEDICINE

## 2022-10-15 RX ORDER — PREDNISONE 20 MG/1
20 TABLET ORAL
Qty: 12 TABLET | Refills: 0 | Status: SHIPPED | OUTPATIENT
Start: 2022-10-15

## 2022-10-15 RX ADMIN — DEXAMETHASONE SODIUM PHOSPHATE 10 MG: 10 INJECTION, SOLUTION INTRAMUSCULAR; INTRAVENOUS at 17:00

## 2022-10-15 RX ADMIN — IOPAMIDOL 75 ML: 612 INJECTION, SOLUTION INTRAVENOUS at 17:31

## 2022-10-15 NOTE — ED PROVIDER NOTES
"Subjective   History of Present Illness  31-year-old female sent from urgent care to be evaluated for \"sore throat.\"  The patient states that she began experiencing soreness to her throat just over 2 weeks ago.  At that time she went to urgent care and was given a prescription for Keflex which she took for 10 days and just finished late this past week.  However, over the past few days she has had worsening pain to her throat, especially on the left side and notes that swallowing is quite painful.  No fevers, chills, or systemic symptoms.  Normal voice.  She went back to urgent care today and was advised to come to the emergency department as they were concerned about a potential \"peritonsillar abscess.\"  Of note, the patient is a flight medic and a good historian.        Review of Systems   HENT: Positive for sore throat.    All other systems reviewed and are negative.      Past Medical History:   Diagnosis Date   • Scarlet fever        Allergies   Allergen Reactions   • Biaxin [Clarithromycin] GI Intolerance   • Latex Hives   • Penicillins Hives       Past Surgical History:   Procedure Laterality Date   • TONSILLECTOMY     • WISDOM TOOTH EXTRACTION         Family History   Problem Relation Age of Onset   • Thyroid disease Mother    • Hyperlipidemia Father    • Hypertension Father    • Hypertension Brother    • Lung cancer Maternal Grandmother    • Heart attack Maternal Grandfather 38   • Alcohol abuse Maternal Grandfather    • Cancer Paternal Grandmother    • Hypertension Paternal Grandfather    • Hyperlipidemia Paternal Grandfather    • Heart attack Paternal Grandfather    • Heart disease Paternal Grandfather    • Heart failure Paternal Grandfather        Social History     Socioeconomic History   • Marital status:    Tobacco Use   • Smoking status: Every Day     Packs/day: 0.50     Years: 6.00     Pack years: 3.00     Types: Cigarettes   • Smokeless tobacco: Never   Substance and Sexual Activity   • " "Alcohol use: Yes     Comment: SOCIALLY   • Drug use: No           Objective   Physical Exam  Vitals and nursing note reviewed.   Constitutional:       General: She is not in acute distress.     Appearance: She is well-developed. She is not diaphoretic.      Comments: Nontoxic-appearing female   HENT:      Head: Normocephalic and atraumatic.      Comments: Posterior oropharynx is erythematous, uvula is midline, normal voice, no trismus, no tonsillar exudates present, no fullness of left tonsil on visual inspection when compared to the right with no tonsillar asymmetry noted  Eyes:      Pupils: Pupils are equal, round, and reactive to light.   Cardiovascular:      Rate and Rhythm: Normal rate and regular rhythm.      Heart sounds: Normal heart sounds. No murmur heard.    No friction rub. No gallop.   Pulmonary:      Effort: Pulmonary effort is normal. No respiratory distress.      Breath sounds: Normal breath sounds. No wheezing or rales.   Abdominal:      General: Bowel sounds are normal. There is no distension.      Palpations: Abdomen is soft. There is no mass.      Tenderness: There is no abdominal tenderness. There is no guarding or rebound.   Musculoskeletal:         General: Normal range of motion.      Cervical back: Neck supple.   Skin:     General: Skin is warm and dry.      Findings: No erythema or rash.   Neurological:      Mental Status: She is alert and oriented to person, place, and time.   Psychiatric:         Mood and Affect: Mood normal.         Thought Content: Thought content normal.         Judgment: Judgment normal.         Procedures           ED Course  ED Course as of 10/15/22 1903   Sat Oct 15, 2022   1729 31-year-old female presents for evaluation of \"sore throat.\"  She states that she began experiencing soreness to her throat just over 2 weeks ago.  She went to an urgent care and was given a prescription for Keflex which she took for 10 days and just finished late last week.  Over the past " "few days she has had worsening pain and difficulty swallowing, especially on the \"left side\" of her throat, prompting her visit to the emergency department at the urging of urgent care.  On arrival to the ED, the patient is nontoxic-appearing.  The patient is afebrile.  On exam, she has erythema noted to her posterior oropharynx with no obvious peritonsillar abscess present.  Normal voice.  No trismus.  Uvula is midline.  No tonsillar exudates present.  Labs are unrevealing.  Strep is negative.  We will obtain imaging, and we will reassess following initial interventions.  Decadron given. [DD]   1758 CT negative for peritonsillar abscess. [DD]   1801 Upon reevaluation, the patient looks well.  Reassured and counseled regarding symptomatic management.  No indication for repeat antibiotics at this point.  Prescription for oral steroids.  She will follow-up with her primary care physician within the next week.  Agreeable with plan and given appropriate strict return precautions. [DD]      ED Course User Index  [DD] Rosendo Santiago MD                                          Recent Results (from the past 24 hour(s))   Rapid Strep A Screen - Swab, Throat    Collection Time: 10/15/22  4:28 PM    Specimen: Throat; Swab   Result Value Ref Range    Strep A Ag Negative Negative   Urinalysis With Culture If Indicated - Urine, Clean Catch    Collection Time: 10/15/22  4:35 PM    Specimen: Urine, Clean Catch   Result Value Ref Range    Color, UA Yellow Yellow, Straw    Appearance, UA Cloudy (A) Clear    pH, UA <=5.0 5.0 - 8.0    Specific Gravity, UA 1.021 1.001 - 1.030    Glucose, UA Negative Negative    Ketones, UA Negative Negative    Bilirubin, UA Negative Negative    Blood, UA Negative Negative    Protein, UA Negative Negative    Leuk Esterase, UA Negative Negative    Nitrite, UA Negative Negative    Urobilinogen, UA 0.2 E.U./dL 0.2 - 1.0 E.U./dL   Urinalysis, Microscopic Only - Urine, Clean Catch    Collection Time: " 10/15/22  4:35 PM    Specimen: Urine, Clean Catch   Result Value Ref Range    RBC, UA 0-2 None Seen, 0-2 /HPF    WBC, UA 3-5 (A) None Seen, 0-2 /HPF    Bacteria, UA 2+ (A) None Seen, Trace /HPF    Squamous Epithelial Cells, UA 7-12 (A) None Seen, 0-2 /HPF    Hyaline Casts, UA 0-6 0 - 6 /LPF    Mucus, UA Small/1+ (A) None Seen, Trace /HPF    Methodology Manual Light Microscopy    POC Urine Pregnancy    Collection Time: 10/15/22  4:38 PM    Specimen: Urine   Result Value Ref Range    HCG, Urine, QL Negative     Lot Number [id number]     Internal Positive Control Positive     Internal Negative Control Negative     Expiration Date 2023-11-30    Mononucleosis Screen    Collection Time: 10/15/22  5:00 PM    Specimen: Blood   Result Value Ref Range    Monospot Negative Negative   CBC Auto Differential    Collection Time: 10/15/22  5:00 PM    Specimen: Blood   Result Value Ref Range    WBC 6.65 3.40 - 10.80 10*3/mm3    RBC 4.01 3.77 - 5.28 10*6/mm3    Hemoglobin 11.4 (L) 12.0 - 15.9 g/dL    Hematocrit 34.9 34.0 - 46.6 %    MCV 87.0 79.0 - 97.0 fL    MCH 28.4 26.6 - 33.0 pg    MCHC 32.7 31.5 - 35.7 g/dL    RDW 13.8 12.3 - 15.4 %    RDW-SD 43.6 37.0 - 54.0 fl    MPV 10.4 6.0 - 12.0 fL    Platelets 346 140 - 450 10*3/mm3    Neutrophil % 56.1 42.7 - 76.0 %    Lymphocyte % 29.0 19.6 - 45.3 %    Monocyte % 7.5 5.0 - 12.0 %    Eosinophil % 6.5 (H) 0.3 - 6.2 %    Basophil % 0.6 0.0 - 1.5 %    Immature Grans % 0.3 0.0 - 0.5 %    Neutrophils, Absolute 3.73 1.70 - 7.00 10*3/mm3    Lymphocytes, Absolute 1.93 0.70 - 3.10 10*3/mm3    Monocytes, Absolute 0.50 0.10 - 0.90 10*3/mm3    Eosinophils, Absolute 0.43 (H) 0.00 - 0.40 10*3/mm3    Basophils, Absolute 0.04 0.00 - 0.20 10*3/mm3    Immature Grans, Absolute 0.02 0.00 - 0.05 10*3/mm3    nRBC 0.0 0.0 - 0.2 /100 WBC   POC CHEM 8    Collection Time: 10/15/22  5:02 PM    Specimen: Blood   Result Value Ref Range    Glucose 81 70 - 130 mg/dL    BUN 10 8 - 26 mg/dL    Creatinine 0.70 0.60 -  1.30 mg/dL    Sodium 140 138 - 146 mmol/L    POC Potassium 3.6 3.5 - 4.9 mmol/L    Chloride 101 98 - 109 mmol/L    Total CO2 27 24 - 29 mmol/L    Hemoglobin 12.2 12.0 - 17.0 g/dL    Hematocrit 36 (L) 38 - 51 %    Ionized Calcium 1.26 1.20 - 1.32 mmol/L    eGFR 117.3 >60.0 mL/min/1.73     Note: In addition to lab results from this visit, the labs listed above may include labs taken at another facility or during a different encounter within the last 24 hours. Please correlate lab times with ED admission and discharge times for further clarification of the services performed during this visit.    CT Soft Tissue Neck With Contrast   Final Result   1.  No acute process identified within neck.   2.  Mucosal disease within right sphenoid sinus.       This report was finalized on 10/15/2022 5:46 PM by Rosendo Yost MD.            Vitals:    10/15/22 1758 10/15/22 1759 10/15/22 1800 10/15/22 1805   BP:       BP Location:       Patient Position:       Pulse:    78   Resp:    15   Temp:       TempSrc:       SpO2: 97% 98% 98%    Weight:       Height:         Medications   dexamethasone (DECADRON) IVPB 10 mg (0 mg Intravenous Stopped 10/15/22 1734)   iopamidol (ISOVUE-300) 61 % injection 100 mL (75 mL Intravenous Given 10/15/22 1731)     ECG/EMG Results (last 24 hours)     ** No results found for the last 24 hours. **        No orders to display           MDM    Final diagnoses:   Viral pharyngitis       ED Disposition  ED Disposition     ED Disposition   Discharge    Condition   Stable    Comment   --             Hernando Simon MD  [address] [facility] [address]  [phone]    In 1 week           Medication List      New Prescriptions    predniSONE 20 MG tablet  Commonly known as: DELTASONE  Take 1 tablet by mouth 3 (Three) Times a Day With Meals.           Where to Get Your Medications      These medications were sent to [facility] [id number] [facility], [place] [facility] [address] - [phone] PH  - [phone] FX  [address] , [place] [address]    Phone: [phone]   · predniSONE 20 MG tablet          Rosendo Santiago MD  10/15/22 3296

## 2022-10-17 LAB — BACTERIA SPEC AEROBE CULT: NORMAL
